# Patient Record
Sex: FEMALE | Race: WHITE | NOT HISPANIC OR LATINO | Employment: FULL TIME | URBAN - METROPOLITAN AREA
[De-identification: names, ages, dates, MRNs, and addresses within clinical notes are randomized per-mention and may not be internally consistent; named-entity substitution may affect disease eponyms.]

---

## 2019-06-14 RX ORDER — LORATADINE 10 MG/1
10 TABLET ORAL DAILY
COMMUNITY

## 2019-06-17 ENCOUNTER — ANESTHESIA EVENT (OUTPATIENT)
Dept: PERIOP | Facility: HOSPITAL | Age: 34
End: 2019-06-17
Payer: COMMERCIAL

## 2019-06-18 ENCOUNTER — HOSPITAL ENCOUNTER (OUTPATIENT)
Facility: HOSPITAL | Age: 34
Setting detail: OUTPATIENT SURGERY
Discharge: HOME | End: 2019-06-18
Attending: OPHTHALMOLOGY | Admitting: OPHTHALMOLOGY
Payer: COMMERCIAL

## 2019-06-18 ENCOUNTER — ANESTHESIA (OUTPATIENT)
Dept: PERIOP | Facility: HOSPITAL | Age: 34
End: 2019-06-18
Payer: COMMERCIAL

## 2019-06-18 VITALS
OXYGEN SATURATION: 97 % | WEIGHT: 195 LBS | DIASTOLIC BLOOD PRESSURE: 67 MMHG | HEART RATE: 66 BPM | HEIGHT: 63 IN | RESPIRATION RATE: 18 BRPM | BODY MASS INDEX: 34.55 KG/M2 | TEMPERATURE: 96.8 F | SYSTOLIC BLOOD PRESSURE: 114 MMHG

## 2019-06-18 PROBLEM — H02.422 MYOGENIC PTOSIS OF LEFT EYELID: Status: RESOLVED | Noted: 2019-06-18 | Resolved: 2019-06-18

## 2019-06-18 PROBLEM — H02.422 MYOGENIC PTOSIS OF LEFT EYELID: Status: ACTIVE | Noted: 2019-06-18

## 2019-06-18 PROBLEM — H53.452 OTHER LOCALIZED VISUAL FIELD DEFECT, LEFT EYE: Status: ACTIVE | Noted: 2019-06-18

## 2019-06-18 PROBLEM — Q10.0 CONGENITAL PTOSIS: Status: ACTIVE | Noted: 2019-06-18

## 2019-06-18 PROBLEM — H02.024: Status: RESOLVED | Noted: 2019-06-18 | Resolved: 2019-06-18

## 2019-06-18 PROBLEM — H02.024: Status: ACTIVE | Noted: 2019-06-18

## 2019-06-18 PROBLEM — H53.142: Status: ACTIVE | Noted: 2019-06-18

## 2019-06-18 PROBLEM — H53.452 OTHER LOCALIZED VISUAL FIELD DEFECT, LEFT EYE: Status: RESOLVED | Noted: 2019-06-18 | Resolved: 2019-06-18

## 2019-06-18 PROBLEM — Q10.0 CONGENITAL PTOSIS: Status: RESOLVED | Noted: 2019-06-18 | Resolved: 2019-06-18

## 2019-06-18 PROBLEM — H02.412: Status: ACTIVE | Noted: 2019-06-18

## 2019-06-18 PROBLEM — H53.142: Status: RESOLVED | Noted: 2019-06-18 | Resolved: 2019-06-18

## 2019-06-18 PROBLEM — H02.412: Status: RESOLVED | Noted: 2019-06-18 | Resolved: 2019-06-18

## 2019-06-18 LAB
EXT PREGNANCY TEST URINE: NEGATIVE
EXT. CONTROL: NORMAL

## 2019-06-18 PROCEDURE — 81025 URINE PREGNANCY TEST: CPT | Performed by: OPHTHALMOLOGY

## 2019-06-18 RX ORDER — NEOMYCIN SULFATE, POLYMYXIN B SULFATE, AND DEXAMETHASONE 3.5; 10000; 1 MG/G; [USP'U]/G; MG/G
OINTMENT OPHTHALMIC 3 TIMES DAILY
Status: DISCONTINUED | OUTPATIENT
Start: 2019-06-18 | End: 2019-06-18 | Stop reason: HOSPADM

## 2019-06-18 RX ORDER — NEOMYCIN SULFATE, POLYMYXIN B SULFATE, AND DEXAMETHASONE 3.5; 10000; 1 MG/G; [USP'U]/G; MG/G
OINTMENT OPHTHALMIC AS NEEDED
Status: DISCONTINUED | OUTPATIENT
Start: 2019-06-18 | End: 2019-06-18 | Stop reason: HOSPADM

## 2019-06-18 RX ORDER — FENTANYL CITRATE 50 UG/ML
INJECTION, SOLUTION INTRAMUSCULAR; INTRAVENOUS AS NEEDED
Status: DISCONTINUED | OUTPATIENT
Start: 2019-06-18 | End: 2019-06-18 | Stop reason: SURG

## 2019-06-18 RX ORDER — PROPOFOL 10 MG/ML
INJECTION, EMULSION INTRAVENOUS AS NEEDED
Status: DISCONTINUED | OUTPATIENT
Start: 2019-06-18 | End: 2019-06-18 | Stop reason: SURG

## 2019-06-18 RX ORDER — LIDOCAINE HYDROCHLORIDE AND EPINEPHRINE 20; 5 MG/ML; UG/ML
INJECTION, SOLUTION EPIDURAL; INFILTRATION; INTRACAUDAL; PERINEURAL AS NEEDED
Status: DISCONTINUED | OUTPATIENT
Start: 2019-06-18 | End: 2019-06-18 | Stop reason: HOSPADM

## 2019-06-18 RX ORDER — MIDAZOLAM HYDROCHLORIDE 1 MG/ML
INJECTION INTRAMUSCULAR; INTRAVENOUS AS NEEDED
Status: DISCONTINUED | OUTPATIENT
Start: 2019-06-18 | End: 2019-06-18 | Stop reason: SURG

## 2019-06-18 RX ORDER — EPINEPHRINE 1 MG/ML
INJECTION, SOLUTION, CONCENTRATE INTRAVENOUS AS NEEDED
Status: DISCONTINUED | OUTPATIENT
Start: 2019-06-18 | End: 2019-06-18 | Stop reason: HOSPADM

## 2019-06-18 RX ORDER — SODIUM CHLORIDE 9 MG/ML
125 INJECTION, SOLUTION INTRAVENOUS CONTINUOUS
Status: DISCONTINUED | OUTPATIENT
Start: 2019-06-18 | End: 2019-06-18 | Stop reason: HOSPADM

## 2019-06-18 RX ORDER — DIPHENHYDRAMINE HYDROCHLORIDE 50 MG/ML
12.5 INJECTION INTRAMUSCULAR; INTRAVENOUS ONCE
Status: DISCONTINUED | OUTPATIENT
Start: 2019-06-18 | End: 2019-06-18 | Stop reason: HOSPADM

## 2019-06-18 RX ORDER — TETRACAINE HYDROCHLORIDE 5 MG/ML
SOLUTION OPHTHALMIC AS NEEDED
Status: DISCONTINUED | OUTPATIENT
Start: 2019-06-18 | End: 2019-06-18 | Stop reason: HOSPADM

## 2019-06-18 RX ORDER — DEXAMETHASONE SODIUM PHOSPHATE 4 MG/ML
4 INJECTION, SOLUTION INTRA-ARTICULAR; INTRALESIONAL; INTRAMUSCULAR; INTRAVENOUS; SOFT TISSUE ONCE AS NEEDED
Status: DISCONTINUED | OUTPATIENT
Start: 2019-06-18 | End: 2019-06-18 | Stop reason: HOSPADM

## 2019-06-18 RX ORDER — FENTANYL CITRATE/PF 50 MCG/ML
25 SYRINGE (ML) INJECTION
Status: DISCONTINUED | OUTPATIENT
Start: 2019-06-18 | End: 2019-06-18 | Stop reason: HOSPADM

## 2019-06-18 RX ORDER — ACETAMINOPHEN 325 MG/1
650 TABLET ORAL EVERY 4 HOURS PRN
Status: DISCONTINUED | OUTPATIENT
Start: 2019-06-18 | End: 2019-06-18 | Stop reason: HOSPADM

## 2019-06-18 RX ORDER — PROCHLORPERAZINE MALEATE 10 MG
10 TABLET ORAL EVERY 6 HOURS PRN
Status: DISCONTINUED | OUTPATIENT
Start: 2019-06-18 | End: 2019-06-18 | Stop reason: HOSPADM

## 2019-06-18 RX ORDER — MAGNESIUM HYDROXIDE 1200 MG/15ML
LIQUID ORAL AS NEEDED
Status: DISCONTINUED | OUTPATIENT
Start: 2019-06-18 | End: 2019-06-18 | Stop reason: HOSPADM

## 2019-06-18 RX ORDER — PROPOFOL 10 MG/ML
INJECTION, EMULSION INTRAVENOUS CONTINUOUS PRN
Status: DISCONTINUED | OUTPATIENT
Start: 2019-06-18 | End: 2019-06-18 | Stop reason: SURG

## 2019-06-18 RX ORDER — FENTANYL CITRATE/PF 50 MCG/ML
12.5 SYRINGE (ML) INJECTION
Status: DISCONTINUED | OUTPATIENT
Start: 2019-06-18 | End: 2019-06-18 | Stop reason: HOSPADM

## 2019-06-18 RX ADMIN — MIDAZOLAM HYDROCHLORIDE 2 MG: 1 INJECTION, SOLUTION INTRAMUSCULAR; INTRAVENOUS at 10:37

## 2019-06-18 RX ADMIN — FENTANYL CITRATE 50 MCG: 50 INJECTION INTRAMUSCULAR; INTRAVENOUS at 10:37

## 2019-06-18 RX ADMIN — SODIUM CHLORIDE: 0.9 INJECTION, SOLUTION INTRAVENOUS at 10:15

## 2019-06-18 RX ADMIN — FENTANYL CITRATE: 50 INJECTION INTRAMUSCULAR; INTRAVENOUS at 12:11

## 2019-06-18 RX ADMIN — PROPOFOL 50 MG: 10 INJECTION, EMULSION INTRAVENOUS at 10:37

## 2019-06-18 RX ADMIN — FENTANYL CITRATE 50 MCG: 50 INJECTION INTRAMUSCULAR; INTRAVENOUS at 11:44

## 2019-06-18 RX ADMIN — PROPOFOL 120 MCG/KG/MIN: 10 INJECTION, EMULSION INTRAVENOUS at 10:37

## 2021-03-30 DIAGNOSIS — Z23 ENCOUNTER FOR IMMUNIZATION: ICD-10-CM

## 2023-04-27 ENCOUNTER — TELEPHONE (OUTPATIENT)
Dept: FAMILY MEDICINE CLINIC | Facility: CLINIC | Age: 38
End: 2023-04-27

## 2023-04-27 ENCOUNTER — OFFICE VISIT (OUTPATIENT)
Dept: FAMILY MEDICINE CLINIC | Facility: CLINIC | Age: 38
End: 2023-04-27

## 2023-04-27 VITALS
BODY MASS INDEX: 33.13 KG/M2 | RESPIRATION RATE: 16 BRPM | OXYGEN SATURATION: 98 % | DIASTOLIC BLOOD PRESSURE: 78 MMHG | HEIGHT: 63 IN | HEART RATE: 80 BPM | TEMPERATURE: 98.4 F | WEIGHT: 187 LBS | SYSTOLIC BLOOD PRESSURE: 124 MMHG

## 2023-04-27 DIAGNOSIS — E66.09 CLASS 1 OBESITY DUE TO EXCESS CALORIES WITHOUT SERIOUS COMORBIDITY WITH BODY MASS INDEX (BMI) OF 33.0 TO 33.9 IN ADULT: ICD-10-CM

## 2023-04-27 DIAGNOSIS — F41.9 ANXIETY: Primary | ICD-10-CM

## 2023-04-27 DIAGNOSIS — J30.2 SEASONAL ALLERGIC RHINITIS, UNSPECIFIED TRIGGER: ICD-10-CM

## 2023-04-27 DIAGNOSIS — Z00.00 HEALTHCARE MAINTENANCE: ICD-10-CM

## 2023-04-27 PROBLEM — E66.811 CLASS 1 OBESITY DUE TO EXCESS CALORIES WITHOUT SERIOUS COMORBIDITY WITH BODY MASS INDEX (BMI) OF 33.0 TO 33.9 IN ADULT: Status: ACTIVE | Noted: 2023-04-27

## 2023-04-27 RX ORDER — FLUTICASONE PROPIONATE 50 MCG
SPRAY, SUSPENSION (ML) NASAL
COMMUNITY
Start: 2011-11-27

## 2023-04-27 NOTE — ASSESSMENT & PLAN NOTE
- Prescription sent for Wegovy 0 25 mg weekly x 4 weeks  Discussed potential side effects   - Continue healthy diet and regular exercise   - Recommend follow up in 1 month

## 2023-04-27 NOTE — PROGRESS NOTES
Name: Silvia Nieto      : 1985      MRN: [id number]  Encounter Provider: JOSELO Stewart  Encounter Date: 2023   Encounter department: [address] [place] [address]  Anxiety  Assessment & Plan:  - Well controlled on Zoloft 50 mg daily  Continue same    - Will continue to monitor  2  Seasonal allergic rhinitis, unspecified trigger  Assessment & Plan:  - Stable on Claritin and Flonase  Continue same    - Will continue to monitor  3  Class 1 obesity due to excess calories without serious comorbidity with body mass index (BMI) of 33 0 to 33 9 in adult  Assessment & Plan:  - Prescription sent for Wegovy 0 25 mg weekly x 4 weeks  Discussed potential side effects   - Continue healthy diet and regular exercise   - Recommend follow up in 1 month  Orders:  -     Lipid Panel with Direct LDL reflex; Future  -     Insulin, fasting; Future  -     Semaglutide-Weight Management (WEGOVY) 0 25 MG/0 5ML; Inject 0 5 mL (0 25 mg total) under the skin once a week for 4 doses    4  Healthcare maintenance  Assessment & Plan:  - Reviewed immunizations and screenings  - UTD with dental, vision, and GYN exams    - Routine labs ordered  Orders:  -     Lipid Panel with Direct LDL reflex; Future      BMI Counseling: Body mass index is 33 13 kg/m²  The BMI is above normal  Nutrition recommendations include decreasing portion sizes, encouraging healthy choices of fruits and vegetables, decreasing fast food intake and reducing intake of cholesterol  Exercise recommendations include moderate physical activity 150 minutes/week and exercising 3-5 times per week  Pharmacotherapy was ordered to help aid in weight loss  Rationale for BMI follow-up plan is due to patient being overweight or obese  Depression Screening and Follow-up Plan: Patient was screened for depression during today's encounter  They screened negative with a PHQ-2 score of 1          Subjective Patient with PMH of anxiety and seasonal allergies presents today to establish care  She is taking her prescribed medication and reports no side effects  Her anxiety is well controlled on Zoloft 50 mg daily  She is concerned today regarding having difficulty losing weight  States she has tried many things to lose weight in the past  Spent a lot of money on a nutritionist and lost about 25 pounds but gained it back when she started eating anything other than high protein and vegetables  She has been counting macros  She does exercise at [facility] 3 times per week doing high intensity interval training for about 60 minutes  Her lowest weight has been 178 pounds  States once she hits that weight she just plateaus  She denies any other significant concerns or complaints today  Review of Systems   Constitutional: Negative for fatigue and fever  HENT: Negative for congestion, rhinorrhea and trouble swallowing  Eyes: Negative for visual disturbance  Respiratory: Negative for cough and shortness of breath  Cardiovascular: Negative for chest pain and palpitations  Gastrointestinal: Negative for abdominal pain and blood in stool  Endocrine: Negative for cold intolerance and heat intolerance  Genitourinary: Negative for difficulty urinating, dysuria and frequency  Musculoskeletal: Negative for gait problem  Skin: Negative for rash  Neurological: Negative for dizziness, syncope and headaches  Hematological: Negative for adenopathy  Psychiatric/Behavioral: Negative for behavioral problems         Past Medical History:   Diagnosis Date   • Seasonal allergies      Past Surgical History:   Procedure Laterality Date   • EYE SURGERY      X2 as a child   • TN BLEPHAROPLASTY UPPER EYELID W/EXCESSIVE SKIN Left 6/18/2019    Procedure: BLEPHAROPLASTY UPPER LID;  Surgeon: Lubna Estes MD;  Location: [address] [place] [address];  Service: Ophthalmology   • TN REPAIR ENTROPION SUTURE Left 6/18/2019    Procedure: REPAIR ENTROPIAN UPPER LID;  Surgeon: Rhona Jaimes MD;  Location: [address] [place] [address] OR;  Service: Ophthalmology   • DC RPR BLEPHAROPTOSIS LEVATOR RESCJ/ADVMNT INTERNAL Left 6/18/2019    Procedure: Ember Burton PTOSIS REPAIR UPPER LID;  Surgeon: Rhona Jaimes MD;  Location: [address] [place] [address];  Service: Ophthalmology   • TONSILLECTOMY       Family History   Problem Relation Age of Onset   • Hypertension Mother    • Heart disease Maternal Grandmother    • Hypertension Maternal Grandfather    • Stroke Paternal Grandfather      Social History     Socioeconomic History   • Marital status: /Civil Union     Spouse name: None   • Number of children: None   • Years of education: None   • Highest education level: None   Occupational History   • None   Tobacco Use   • Smoking status: Never   • Smokeless tobacco: Never   Vaping Use   • Vaping Use: Never used   Substance and Sexual Activity   • Alcohol use: Never   • Drug use: Never   • Sexual activity: None   Other Topics Concern   • None   Social History Narrative   • None     Social Determinants of Health     Financial Resource Strain: Not on file   Food Insecurity: Not on file   Transportation Needs: Not on file   Physical Activity: Not on file   Stress: Not on file   Social Connections: Not on file   Intimate Partner Violence: Not on file   Housing Stability: Not on file     Current Outpatient Medications on File Prior to Visit   Medication Sig   • fluticasone (FLONASE) 50 mcg/act nasal spray into each nostril   • loratadine (CLARITIN) 10 mg tablet Take 10 mg by mouth daily   • sertraline (ZOLOFT) 50 mg tablet Take 50 mg by mouth daily     Allergies   Allergen Reactions   • Ciprofloxacin Hives   • Hydrocodone Hives     Immunization History   Administered Date(s) Administered   • COVID-19 PFIZER VACCINE 0 3 ML IM 03/30/2021, 04/20/2021, 01/05/2022   • HPV Quadrivalent 06/18/2007, 10/01/2007, 02/04/2008   • Hep B, adult 11/07/2018, 12/28/2018, 05/07/2019   • INFLUENZA 01/22/2014, 10/30/2015, 10/30/2015, "09/15/2017, 09/15/2017, 10/05/2018, 10/05/2018, 11/05/2020, 11/30/2022, 11/30/2022   • MMR 07/13/2014   • Tdap 04/18/2014, 02/13/2019       Objective     /78 (BP Location: Right arm, Patient Position: Sitting, Cuff Size: Adult)   Pulse 80   Temp 98 4 °F (36 9 °C) (Tympanic)   Resp 16   Ht 5' 3\" (1 6 m)   Wt 84 8 kg (187 lb)   SpO2 98%   BMI 33 13 kg/m²     Physical Exam  Vitals and nursing note reviewed  Constitutional:       General: She is not in acute distress  Appearance: Normal appearance  She is not ill-appearing  HENT:      Head: Normocephalic and atraumatic  Right Ear: Tympanic membrane, ear canal and external ear normal       Left Ear: Tympanic membrane, ear canal and external ear normal       Nose: Nose normal       Mouth/Throat:      Mouth: Mucous membranes are moist    Eyes:      Extraocular Movements: Extraocular movements intact  Conjunctiva/sclera: Conjunctivae normal       Pupils: Pupils are equal, round, and reactive to light  Cardiovascular:      Rate and Rhythm: Normal rate and regular rhythm  Heart sounds: Normal heart sounds  Pulmonary:      Effort: Pulmonary effort is normal       Breath sounds: Normal breath sounds  Abdominal:      General: Bowel sounds are normal       Palpations: Abdomen is soft  Musculoskeletal:         General: Normal range of motion  Cervical back: Normal range of motion  Lymphadenopathy:      Cervical: No cervical adenopathy  Skin:     General: Skin is warm and dry  Neurological:      Mental Status: She is alert and oriented to person, place, and time  Cranial Nerves: No cranial nerve deficit     Psychiatric:         Mood and Affect: Mood normal          Behavior: Behavior normal        JOSELO Junior  "

## 2023-04-28 NOTE — TELEPHONE ENCOUNTER
Pt aware [facility] is a formulary exclusion  She is going to think about the phentermine and let us know if she would like to try the medication

## 2023-06-26 PROBLEM — Z00.00 HEALTHCARE MAINTENANCE: Status: RESOLVED | Noted: 2023-04-27 | Resolved: 2023-06-26

## 2023-06-27 ENCOUNTER — TELEPHONE (OUTPATIENT)
Dept: FAMILY MEDICINE CLINIC | Facility: CLINIC | Age: 38
End: 2023-06-27

## 2023-06-27 DIAGNOSIS — E66.09 CLASS 1 OBESITY DUE TO EXCESS CALORIES WITHOUT SERIOUS COMORBIDITY WITH BODY MASS INDEX (BMI) OF 33.0 TO 33.9 IN ADULT: Primary | ICD-10-CM

## 2023-06-27 RX ORDER — PHENTERMINE HYDROCHLORIDE 37.5 MG/1
37.5 TABLET ORAL DAILY
Qty: 30 TABLET | Refills: 0 | Status: SHIPPED | OUTPATIENT
Start: 2023-06-27

## 2023-06-27 NOTE — TELEPHONE ENCOUNTER
Pt was seen on 4/27/23 regarding weight loss  She is requesting oral medication for weight loss  Did you want her to be seen prior to starting medication    Please advise

## 2023-06-27 NOTE — TELEPHONE ENCOUNTER
----- Message from Heide Warner sent at 6/27/2023  8:58 AM EDT -----  Regarding: Medication   Contact: [phone]  Since my insurance will not approve Wegovy I would like to request the pills you recommended as my second option  Please let me know if that is possible

## 2023-07-06 ENCOUNTER — TELEPHONE (OUTPATIENT)
Dept: FAMILY MEDICINE CLINIC | Facility: CLINIC | Age: 38
End: 2023-07-06

## 2023-07-06 NOTE — TELEPHONE ENCOUNTER
----- Message from Veronica Suárez sent at 7/6/2023 10:41 AM EDT -----  Regarding: Current Medication Concern  Contact: [phone]  Lalo Wing,  I was doing some research on phentermine and Zoloft and I was wondering if I am able to take both of them. I was originally prescribed Zoloft for postpartum depression and I don't really think I have symptoms of depression any longer, more so just the anxiety. I am just wondering if Lexapro would be an alternative that I can take or if we should just discuss this at my next appointment and continue taking the Zoloft?

## 2023-11-09 DIAGNOSIS — F41.9 ANXIETY: Primary | ICD-10-CM

## 2023-11-09 NOTE — TELEPHONE ENCOUNTER
Refills have been requested for the following medications:         sertraline (ZOLOFT) 50 mg tablet     Preferred pharmacy: [address] [place] [address] seen 4/27/23  Sent message back to pt on mychart to make follow up appt   #30 sent to pharmacy

## 2023-11-17 ENCOUNTER — TELEPHONE (OUTPATIENT)
Dept: FAMILY MEDICINE CLINIC | Facility: CLINIC | Age: 38
End: 2023-11-17

## 2023-11-17 DIAGNOSIS — E66.09 CLASS 1 OBESITY DUE TO EXCESS CALORIES WITHOUT SERIOUS COMORBIDITY WITH BODY MASS INDEX (BMI) OF 33.0 TO 33.9 IN ADULT: Primary | ICD-10-CM

## 2023-11-17 NOTE — TELEPHONE ENCOUNTER
----- Message from Martha Evangelista sent at 11/17/2023  8:40 AM EST -----  Regarding: mounjaro  Contact: [phone]  I do believe the require a prior authorization, if we can start that process.

## 2023-11-17 NOTE — TELEPHONE ENCOUNTER
----- Message from Rocky Harish sent at 11/16/2023  4:45 PM EST -----  Regarding: Mounjaro   Contact: [phone]  I was previously prescribed BNLIJL for weight loss which was denied by my insurance provider. We then decided to try out phentermine which I had to stop taking due to the side effects. I was recently advised that [facility] allows mounjaro with a prior authorization. Can we try that?

## 2023-11-17 NOTE — TELEPHONE ENCOUNTER
Mounjaro prior auth started through cover my meds. Per pharmacy , this medication requires step therapy. Type 2 DM required, A1c over 6.5,  as well as 3 mo trial of  metformin. Prior auth outcome pending.

## 2023-11-28 ENCOUNTER — TELEPHONE (OUTPATIENT)
Dept: FAMILY MEDICINE CLINIC | Facility: CLINIC | Age: 38
End: 2023-11-28

## 2023-11-28 NOTE — TELEPHONE ENCOUNTER
----- Message from Jc Redmond sent at 11/28/2023  9:57 AM EST -----  Regarding: mounjaro  Contact: [phone]  Just checking to see where we stand with the prior authorization process.

## 2023-12-18 DIAGNOSIS — F41.9 ANXIETY: ICD-10-CM

## 2023-12-21 NOTE — TELEPHONE ENCOUNTER
Pharmacy requesting refill on Zoloft   Last seen 4/27/23  LM for pt to call office back to schedule follow up  Also sent Vimty message   #30 sent to pharmacy

## 2024-02-09 ENCOUNTER — OFFICE VISIT (OUTPATIENT)
Dept: FAMILY MEDICINE CLINIC | Facility: CLINIC | Age: 39
End: 2024-02-09
Payer: COMMERCIAL

## 2024-02-09 VITALS
WEIGHT: 188 LBS | HEART RATE: 81 BPM | TEMPERATURE: 98.2 F | DIASTOLIC BLOOD PRESSURE: 70 MMHG | RESPIRATION RATE: 16 BRPM | SYSTOLIC BLOOD PRESSURE: 118 MMHG | BODY MASS INDEX: 33.31 KG/M2 | OXYGEN SATURATION: 98 % | HEIGHT: 63 IN

## 2024-02-09 DIAGNOSIS — F41.9 ANXIETY: Primary | ICD-10-CM

## 2024-02-09 DIAGNOSIS — J30.2 SEASONAL ALLERGIC RHINITIS, UNSPECIFIED TRIGGER: ICD-10-CM

## 2024-02-09 PROCEDURE — 99214 OFFICE O/P EST MOD 30 MIN: CPT | Performed by: NURSE PRACTITIONER

## 2024-02-09 NOTE — PROGRESS NOTES
Name: Emma Holley      : 1985      MRN: [id number]  Encounter Provider: JOSELO Deng  Encounter Date: 2024   Encounter department: [facility]    Assessment & Plan     1. Anxiety  Assessment & Plan:  - Well controlled on Zoloft 50 mg daily. Continue same.   - Will continue to monitor.     Orders:  -     sertraline (ZOLOFT) 50 mg tablet; Take 1 tablet (50 mg total) by mouth daily  -     CBC and differential; Future  -     Comprehensive metabolic panel; Future  -     Lipid Panel with Direct LDL reflex; Future  -     TSH, 3rd generation with Free T4 reflex; Future    2. Seasonal allergic rhinitis, unspecified trigger  Assessment & Plan:  - Stable on Claritin and Flonase. Continue same.   - Will continue to monitor.              Subjective     Patient with PMH of anxiety presents to office today for routine follow up. She is taking her prescribed medication and reports no side effects. She tried phentermine in the past for weight loss and didn't like the side effects. Her insurance did not cover injectable medication. She is currently seeing Dr. Fernando who had helped her with weight loss in the past. She is down about 10 pounds. Denies any other concerns or complaints today.         Review of Systems   Constitutional:  Negative for fatigue and fever.   HENT:  Negative for trouble swallowing.    Eyes:  Negative for visual disturbance.   Respiratory:  Negative for cough and shortness of breath.    Cardiovascular:  Negative for chest pain and palpitations.   Gastrointestinal:  Negative for abdominal pain and blood in stool.   Endocrine: Negative for cold intolerance and heat intolerance.   Genitourinary:  Negative for difficulty urinating and dysuria.   Musculoskeletal:  Negative for gait problem.   Skin:  Negative for rash.   Neurological:  Negative for dizziness, syncope and headaches.   Hematological:  Negative for adenopathy.   Psychiatric/Behavioral:  Negative for  behavioral problems.        Past Medical History:   Diagnosis Date    Seasonal allergies      Past Surgical History:   Procedure Laterality Date    EYE SURGERY      X2 as a child    MN BLEPHAROPLASTY UPPER EYELID W/EXCESSIVE SKIN Left 6/18/2019    Procedure: BLEPHAROPLASTY UPPER LID;  Surgeon: Margarita Posey MD;  Location:  MAIN OR;  Service: Ophthalmology    MN REPAIR ENTROPION SUTURE Left 6/18/2019    Procedure: REPAIR ENTROPIAN UPPER LID;  Surgeon: Margarita Posey MD;  Location:  MAIN OR;  Service: Ophthalmology    MN RPR BLEPHAROPTOSIS LEVATOR RESCJ/ADVMNT INTERNAL Left 6/18/2019    Procedure: NED PTOSIS REPAIR UPPER LID;  Surgeon: Margarita Posey MD;  Location:  MAIN OR;  Service: Ophthalmology    TONSILLECTOMY       Family History   Problem Relation Age of Onset    Hypertension Mother     Heart disease Maternal Grandmother     Hypertension Maternal Grandfather     Stroke Paternal Grandfather      Social History     Socioeconomic History    Marital status: /Civil Union     Spouse name: None    Number of children: None    Years of education: None    Highest education level: None   Occupational History    None   Tobacco Use    Smoking status: Never    Smokeless tobacco: Never   Vaping Use    Vaping status: Never Used   Substance and Sexual Activity    Alcohol use: Never    Drug use: Never    Sexual activity: None   Other Topics Concern    None   Social History Narrative    None     Social Determinants of Health     Financial Resource Strain: Not on file   Food Insecurity: Not on file   Transportation Needs: Not on file   Physical Activity: Not on file   Stress: Not on file   Social Connections: Not on file   Intimate Partner Violence: Not on file   Housing Stability: Not on file     Current Outpatient Medications on File Prior to Visit   Medication Sig    fluticasone (FLONASE) 50 mcg/act nasal spray into each nostril    loratadine (CLARITIN) 10 mg tablet Take 10 mg by mouth daily    [DISCONTINUED] sertraline  "(ZOLOFT) 50 mg tablet TAKE 1 TABLET(50 MG) BY MOUTH DAILY    phentermine (ADIPEX-P) 37.5 MG tablet Take 1 tablet (37.5 mg total) by mouth in the morning (Patient not taking: Reported on 2/9/2024)    tirzepatide 2.5 MG/0.5ML Inject 0.5 mL (2.5 mg total) under the skin every 7 days (Patient not taking: Reported on 2/9/2024)     Allergies   Allergen Reactions    Ciprofloxacin Hives    Hydrocodone Hives     Immunization History   Administered Date(s) Administered    COVID-19 PFIZER VACCINE 0.3 ML IM 03/30/2021, 04/20/2021, 01/05/2022    HPV Quadrivalent 06/18/2007, 10/01/2007, 02/04/2008    Hep B, adult 11/07/2018, 12/28/2018, 05/07/2019    INFLUENZA 01/22/2014, 10/30/2015, 10/30/2015, 09/15/2017, 09/15/2017, 10/05/2018, 10/05/2018, 11/05/2020, 11/30/2022, 11/30/2022    MMR 07/13/2014    Tdap 04/18/2014, 02/13/2019       Objective     /70 (BP Location: Left arm, Patient Position: Sitting, Cuff Size: Large)   Pulse 81   Temp 98.2 °F (36.8 °C) (Tympanic)   Resp 16   Ht 5' 3\" (1.6 m)   Wt 85.3 kg (188 lb)   SpO2 98%   BMI 33.30 kg/m²     Physical Exam  Vitals and nursing note reviewed.   Constitutional:       General: She is not in acute distress.     Appearance: Normal appearance. She is not ill-appearing.   HENT:      Head: Normocephalic and atraumatic.      Right Ear: External ear normal.      Left Ear: External ear normal.   Eyes:      Conjunctiva/sclera: Conjunctivae normal.   Cardiovascular:      Rate and Rhythm: Normal rate and regular rhythm.      Heart sounds: Normal heart sounds.   Pulmonary:      Effort: Pulmonary effort is normal.      Breath sounds: Normal breath sounds.   Musculoskeletal:         General: Normal range of motion.      Cervical back: Normal range of motion.   Skin:     General: Skin is warm and dry.   Neurological:      Mental Status: She is alert and oriented to person, place, and time.   Psychiatric:         Mood and Affect: Mood normal.         Behavior: Behavior normal. "       Mecca Maldonado, JOSELO

## 2024-06-30 DIAGNOSIS — Z00.6 ENCOUNTER FOR EXAMINATION FOR NORMAL COMPARISON OR CONTROL IN CLINICAL RESEARCH PROGRAM: ICD-10-CM

## 2025-02-26 ENCOUNTER — OFFICE VISIT (OUTPATIENT)
Dept: FAMILY MEDICINE CLINIC | Facility: CLINIC | Age: 40
End: 2025-02-26
Payer: COMMERCIAL

## 2025-02-26 VITALS
HEIGHT: 63 IN | TEMPERATURE: 98.5 F | WEIGHT: 187 LBS | OXYGEN SATURATION: 98 % | DIASTOLIC BLOOD PRESSURE: 80 MMHG | BODY MASS INDEX: 33.13 KG/M2 | HEART RATE: 81 BPM | SYSTOLIC BLOOD PRESSURE: 124 MMHG | RESPIRATION RATE: 16 BRPM

## 2025-02-26 DIAGNOSIS — Z00.00 HEALTHCARE MAINTENANCE: ICD-10-CM

## 2025-02-26 DIAGNOSIS — F41.9 ANXIETY: ICD-10-CM

## 2025-02-26 DIAGNOSIS — J01.00 ACUTE NON-RECURRENT MAXILLARY SINUSITIS: Primary | ICD-10-CM

## 2025-02-26 PROCEDURE — 99395 PREV VISIT EST AGE 18-39: CPT | Performed by: NURSE PRACTITIONER

## 2025-02-26 PROCEDURE — 99213 OFFICE O/P EST LOW 20 MIN: CPT | Performed by: NURSE PRACTITIONER

## 2025-02-26 RX ORDER — AZITHROMYCIN 250 MG/1
TABLET, FILM COATED ORAL
Qty: 6 TABLET | Refills: 0 | Status: SHIPPED | OUTPATIENT
Start: 2025-02-26 | End: 2025-03-02

## 2025-02-26 NOTE — ASSESSMENT & PLAN NOTE
- Prescription sent for Z-cindi.   - Increase oral hydration and use humidifier.  - Follow up if no improvement.   Orders:  •  azithromycin (Zithromax) 250 mg tablet; Take 2 tablets (500 mg total) by mouth daily for 1 day, THEN 1 tablet (250 mg total) daily for 4 days.

## 2025-02-26 NOTE — PROGRESS NOTES
Name: Emma Holley      : 1985      MRN: [id number]  Encounter Provider: JOSELO Deng  Encounter Date: 2025   Encounter department: [facility]    Assessment & Plan  Acute non-recurrent maxillary sinusitis  - Prescription sent for Z-cindi.   - Increase oral hydration and use humidifier.  - Follow up if no improvement.   Orders:  •  azithromycin (Zithromax) 250 mg tablet; Take 2 tablets (500 mg total) by mouth daily for 1 day, THEN 1 tablet (250 mg total) daily for 4 days.    Anxiety  - Well controlled without medication.   - Will continue to monitor.        Healthcare maintenance  - Reviewed immunizations and screenings.   - UTD with dental, vision, and GYN exams.   - Routine labs ordered.   Orders:  •  CBC and differential; Future  •  Comprehensive metabolic panel; Future  •  Lipid Panel with Direct LDL reflex; Future  •  TSH, 3rd generation with Free T4 reflex; Future         History of Present Illness     Patient presents to office today with complaints of congestion and ears feeling like they are clogged. Also reports slight cough. Symptoms have been occurring for the past 2 weeks. She has been using OTC Mucinex with no improvement. She is due for updated blood work. She denies any other concerns or complaints today.         Review of Systems   Constitutional:  Negative for fatigue and fever.   HENT:  Positive for congestion and ear pain. Negative for trouble swallowing.    Eyes:  Negative for visual disturbance.   Respiratory:  Positive for cough. Negative for shortness of breath.    Cardiovascular:  Negative for chest pain and palpitations.   Gastrointestinal:  Negative for abdominal pain and blood in stool.   Endocrine: Negative for cold intolerance and heat intolerance.   Genitourinary:  Negative for difficulty urinating and dysuria.   Musculoskeletal:  Negative for gait problem.   Skin:  Negative for rash.   Neurological:  Negative for dizziness, syncope  and headaches.   Hematological:  Negative for adenopathy.   Psychiatric/Behavioral:  Negative for behavioral problems.      Past Medical History:   Diagnosis Date   • Seasonal allergies      Past Surgical History:   Procedure Laterality Date   • EYE SURGERY      X2 as a child   • TX BLEPHAROPLASTY UPPER EYELID W/EXCESSIVE SKIN Left 6/18/2019    Procedure: BLEPHAROPLASTY UPPER LID;  Surgeon: Margarita Posey MD;  Location:  MAIN OR;  Service: Ophthalmology   • TX REPAIR ENTROPION SUTURE Left 6/18/2019    Procedure: REPAIR ENTROPIAN UPPER LID;  Surgeon: Margarita Posey MD;  Location:  MAIN OR;  Service: Ophthalmology   • TX RPR BLEPHAROPTOSIS LEVATOR RESCJ/ADVMNT INTERNAL Left 6/18/2019    Procedure: NED PTOSIS REPAIR UPPER LID;  Surgeon: Margarita Posey MD;  Location:  MAIN OR;  Service: Ophthalmology   • TONSILLECTOMY       Family History   Problem Relation Age of Onset   • Hypertension Mother    • Heart disease Maternal Grandmother    • Hypertension Maternal Grandfather    • Stroke Paternal Grandfather      Social History     Tobacco Use   • Smoking status: Never   • Smokeless tobacco: Never   Vaping Use   • Vaping status: Never Used   Substance and Sexual Activity   • Alcohol use: Never   • Drug use: Never   • Sexual activity: Not on file     Current Outpatient Medications on File Prior to Visit   Medication Sig   • fluticasone (FLONASE) 50 mcg/act nasal spray into each nostril   • loratadine (CLARITIN) 10 mg tablet Take 10 mg by mouth daily   • sertraline (ZOLOFT) 50 mg tablet Take 1 tablet (50 mg total) by mouth daily (Patient not taking: Reported on 2/26/2025)   • [DISCONTINUED] phentermine (ADIPEX-P) 37.5 MG tablet Take 1 tablet (37.5 mg total) by mouth in the morning   • [DISCONTINUED] tirzepatide 2.5 MG/0.5ML Inject 0.5 mL (2.5 mg total) under the skin every 7 days     Allergies   Allergen Reactions   • Ciprofloxacin Hives   • Hydrocodone Hives     Immunization History   Administered Date(s) Administered   •  "COVID-19 PFIZER VACCINE 0.3 ML IM 03/30/2021, 04/20/2021, 01/05/2022   • HPV Quadrivalent 06/18/2007, 10/01/2007, 02/04/2008   • Hep B, adult 11/07/2018, 12/28/2018, 05/07/2019   • INFLUENZA 01/22/2014, 10/30/2015, 10/30/2015, 09/15/2017, 09/15/2017, 10/05/2018, 10/05/2018, 11/05/2020, 11/30/2022, 11/30/2022   • Influenza, seasonal, injectable, preservative free 10/11/2024   • MMR 07/13/2014   • Tdap 04/18/2014, 02/13/2019     Objective   /80 (BP Location: Left arm, Patient Position: Sitting, Cuff Size: Large)   Pulse 81   Temp 98.5 °F (36.9 °C) (Tympanic)   Resp 16   Ht 5' 3\" (1.6 m)   Wt 84.8 kg (187 lb)   SpO2 98%   BMI 33.13 kg/m²     Physical Exam  Vitals and nursing note reviewed.   Constitutional:       General: She is not in acute distress.     Appearance: Normal appearance. She is well-developed.   HENT:      Head: Normocephalic and atraumatic.      Right Ear: Tympanic membrane, ear canal and external ear normal.      Left Ear: Tympanic membrane, ear canal and external ear normal.      Nose: Congestion present.      Mouth/Throat:      Mouth: Mucous membranes are moist.   Eyes:      Conjunctiva/sclera: Conjunctivae normal.      Pupils: Pupils are equal, round, and reactive to light.   Cardiovascular:      Rate and Rhythm: Normal rate and regular rhythm.      Heart sounds: Normal heart sounds.   Pulmonary:      Effort: Pulmonary effort is normal.      Breath sounds: Normal breath sounds.   Abdominal:      General: Bowel sounds are normal.      Palpations: Abdomen is soft.   Musculoskeletal:         General: Normal range of motion.      Cervical back: Normal range of motion.   Lymphadenopathy:      Cervical: No cervical adenopathy.   Skin:     General: Skin is warm and dry.   Neurological:      Mental Status: She is alert and oriented to person, place, and time.   Psychiatric:         Mood and Affect: Mood normal.         Behavior: Behavior normal.         "

## 2025-02-26 NOTE — ASSESSMENT & PLAN NOTE
- Reviewed immunizations and screenings.   - UTD with dental, vision, and GYN exams.   - Routine labs ordered.   Orders:  •  CBC and differential; Future  •  Comprehensive metabolic panel; Future  •  Lipid Panel with Direct LDL reflex; Future  •  TSH, 3rd generation with Free T4 reflex; Future

## 2025-03-13 DIAGNOSIS — F41.9 ANXIETY: Primary | ICD-10-CM

## 2025-03-13 RX ORDER — ESCITALOPRAM OXALATE 10 MG/1
10 TABLET ORAL DAILY
Qty: 90 TABLET | Refills: 0 | Status: SHIPPED | OUTPATIENT
Start: 2025-03-13

## 2025-03-28 PROBLEM — J01.00 ACUTE NON-RECURRENT MAXILLARY SINUSITIS: Status: RESOLVED | Noted: 2025-02-26 | Resolved: 2025-03-28

## 2025-03-28 PROBLEM — Z00.00 HEALTHCARE MAINTENANCE: Status: RESOLVED | Noted: 2025-02-26 | Resolved: 2025-03-28

## 2025-04-22 DIAGNOSIS — F41.9 ANXIETY: ICD-10-CM

## (undated) DEVICE — NDL CNTR 20CT FM MAG: Brand: MEDLINE INDUSTRIES, INC.

## (undated) DEVICE — COTTON TIP APPLICATORS: Brand: DEROYAL

## (undated) DEVICE — SYRINGE 1ML NEEDLE 25G X 5/8 SAFETY

## (undated) DEVICE — BASIC PACK: Brand: CONVERTORS

## (undated) DEVICE — STERILE POLYISOPRENE POWDER-FREE SURGICAL GLOVES: Brand: PROTEXIS

## (undated) DEVICE — NEEDLE 27 G X 1 1/2

## (undated) DEVICE — COTTON TIP APPLICTOR 2 PK

## (undated) DEVICE — GAUZE SPONGES,16 PLY: Brand: CURITY

## (undated) DEVICE — ELECTRODE NEEDLE MOD E-Z CLEAN 2.75IN 7CM -0013M

## (undated) DEVICE — SUT CHROMIC 6-0 790G

## (undated) DEVICE — INTENDED FOR TISSUE SEPARATION, AND OTHER PROCEDURES THAT REQUIRE A SHARP SURGICAL BLADE TO PUNCTURE OR CUT.: Brand: BARD-PARKER SAFETY BLADES SIZE 15, STERILE

## (undated) DEVICE — NEEDLE 30 G X 1/2

## (undated) DEVICE — LAMINECTOMY ARM CRADLE FOAM POSITIONER: Brand: CARDINAL HEALTH

## (undated) DEVICE — PENCIL ELECTROSURG E-Z CLEAN -0035H

## (undated) DEVICE — SYRINGE 10ML LL CONTROL TOP

## (undated) DEVICE — REUSABLE GEL PACKINSULATEDMEDIUM5.1 X 10.5 IN.: Brand: CARDINAL HEALTH

## (undated) DEVICE — POV-IOD SOLUTION 4OZ BT

## (undated) DEVICE — SCD SEQUENTIAL COMPRESSION COMFORT SLEEVE MEDIUM KNEE LENGTH: Brand: KENDALL SCD

## (undated) DEVICE — SUT SILK 6-0 G-1/G-1 18 IN 780G

## (undated) DEVICE — NEEDLE BLUNT 18 G X 1 1/2IN

## (undated) DEVICE — X-RAY DETECTABLE SPONGES,16 PLY: Brand: VISTEC

## (undated) DEVICE — PAD GROUNDING ADULT

## (undated) DEVICE — NEEDLE FILTER 5 MICR 19G X 1.5IN

## (undated) DEVICE — SKIN MARKER DUAL TIP WITH RULER CAP, FLEXIBLE RULER AND LABELS: Brand: DEVON

## (undated) DEVICE — WIPES INSTRUMENT EYE DRAIN